# Patient Record
Sex: FEMALE | Race: WHITE | NOT HISPANIC OR LATINO | Employment: PART TIME | URBAN - METROPOLITAN AREA
[De-identification: names, ages, dates, MRNs, and addresses within clinical notes are randomized per-mention and may not be internally consistent; named-entity substitution may affect disease eponyms.]

---

## 2017-09-29 ENCOUNTER — OFFICE VISIT (OUTPATIENT)
Dept: NEUROLOGY | Facility: CLINIC | Age: 47
End: 2017-09-29
Payer: MEDICAID

## 2017-09-29 VITALS
HEART RATE: 76 BPM | SYSTOLIC BLOOD PRESSURE: 122 MMHG | WEIGHT: 146.19 LBS | HEIGHT: 64 IN | BODY MASS INDEX: 24.96 KG/M2 | DIASTOLIC BLOOD PRESSURE: 88 MMHG

## 2017-09-29 DIAGNOSIS — G43.019 INTRACTABLE MIGRAINE WITHOUT AURA AND WITHOUT STATUS MIGRAINOSUS: Primary | ICD-10-CM

## 2017-09-29 DIAGNOSIS — M79.7 FIBROMYALGIA: ICD-10-CM

## 2017-09-29 DIAGNOSIS — B02.29 POSTHERPETIC NEURALGIA: ICD-10-CM

## 2017-09-29 PROCEDURE — 99999 PR PBB SHADOW E&M-NEW PATIENT-LVL III: CPT | Mod: PBBFAC,,, | Performed by: PSYCHIATRY & NEUROLOGY

## 2017-09-29 PROCEDURE — 99203 OFFICE O/P NEW LOW 30 MIN: CPT | Mod: S$PBB,,, | Performed by: PSYCHIATRY & NEUROLOGY

## 2017-09-29 PROCEDURE — 99203 OFFICE O/P NEW LOW 30 MIN: CPT | Mod: PBBFAC | Performed by: PSYCHIATRY & NEUROLOGY

## 2017-09-29 PROCEDURE — 3008F BODY MASS INDEX DOCD: CPT | Mod: ,,, | Performed by: PSYCHIATRY & NEUROLOGY

## 2017-09-29 RX ORDER — FLUTICASONE PROPIONATE 50 MCG
1 SPRAY, SUSPENSION (ML) NASAL
COMMUNITY
Start: 2015-12-15

## 2017-09-29 RX ORDER — BUTALBITAL, ACETAMINOPHEN AND CAFFEINE 50; 325; 40 MG/1; MG/1; MG/1
1 TABLET ORAL 2 TIMES DAILY
Qty: 30 TABLET | Refills: 0 | Status: SHIPPED | OUTPATIENT
Start: 2017-09-29 | End: 2017-11-15 | Stop reason: SDUPTHER

## 2017-09-29 RX ORDER — PREGABALIN 100 MG/1
100 CAPSULE ORAL 2 TIMES DAILY
Qty: 60 CAPSULE | Refills: 5 | Status: SHIPPED | OUTPATIENT
Start: 2017-09-29 | End: 2019-10-15

## 2017-09-29 RX ORDER — PROPRANOLOL HYDROCHLORIDE 40 MG/1
40 TABLET ORAL 2 TIMES DAILY
Qty: 60 TABLET | Refills: 11 | Status: SHIPPED | OUTPATIENT
Start: 2017-09-29 | End: 2018-09-29

## 2017-09-29 RX ORDER — ARIPIPRAZOLE 5 MG/1
1 TABLET ORAL DAILY
COMMUNITY
Start: 2016-03-07

## 2017-09-29 RX ORDER — PROMETHAZINE HYDROCHLORIDE 25 MG/1
25 TABLET ORAL EVERY 6 HOURS PRN
Qty: 30 TABLET | Refills: 3 | Status: SHIPPED | OUTPATIENT
Start: 2017-09-29

## 2017-09-29 RX ORDER — DULOXETIN HYDROCHLORIDE 30 MG/1
1 CAPSULE, DELAYED RELEASE ORAL DAILY
COMMUNITY
Start: 2016-02-18 | End: 2019-11-03

## 2017-09-29 RX ORDER — DULOXETIN HYDROCHLORIDE 60 MG/1
1.5 CAPSULE, DELAYED RELEASE ORAL DAILY
COMMUNITY
Start: 2016-02-18

## 2017-09-29 RX ORDER — PROMETHAZINE HYDROCHLORIDE 25 MG/1
1 TABLET ORAL
COMMUNITY
Start: 2015-12-24 | End: 2017-09-29 | Stop reason: SDUPTHER

## 2017-09-29 RX ORDER — LORATADINE 10 MG/1
1 TABLET ORAL DAILY
COMMUNITY
Start: 2016-03-08

## 2017-09-29 RX ORDER — TRAZODONE HYDROCHLORIDE 100 MG/1
1 TABLET ORAL NIGHTLY
COMMUNITY
Start: 2016-02-16

## 2017-09-29 NOTE — PROGRESS NOTES
This is a 47-year-old right-handed patient who was last seen in this office in light, 2013 when she had a history of intractable headache that is been present since her early teens.  The patient was lost to follow-up in this office and was seen by neurology elsewhere.  She was placed on propranolol 40 mg twice a day which she states has been of significant benefit in reducing the frequency of her headache pain.    The patient states that her migraine headache now occurs about 2 times a month.  There is no clear aura prior to the onset of the pain.  She has identified exposure to bright light including fluorescent lights as a trigger for her headache.  The pain when present is centered around the left temple and left orbit.  The pain is felt as a pounding throbbing discomfort and is clearly associated with nausea, occasional emesis, photophobia, phonophobia, and movement intolerance.  Patient states that she can control the pain if she takes Fioricet one or 2 tablets at the onset of discomfort.  A trial of the triptan's for the abortive treatment was not effective for her.    Since last being seen, the patient had an episode of shingles across the upper abdomen.  This resulted in a severe burning pain and discomfort that has persisted until she was placed on Lyrica 100 mg twice a day.  The Lyrica has controlled her pain.  She tried previously gabapentin without any benefit.    In addition to those complaints, the patient does have fibromyalgia.  She is being treated by pain management for her fibromyalgia and joint pain.      ROS:  GENERAL: No fever, chills,  or weight loss.  SKIN: No rashes, itching or changes in color or texture of skin.  HEAD: No  recent head trauma.  EYES: Visual acuity fine. No photophobia, ocular pain or diplopia.  EARS: Denies ear pain, discharge or vertigo.  NOSE: No loss of smell, no epistaxis or postnasal drip.  MOUTH & THROAT: No hoarseness or change in voice. No excessive gum  bleeding.  NODES: Denies swollen glands.  CHEST: Denies DOWNS, cyanosis, wheezing, cough and sputum production.  CARDIOVASCULAR: Denies chest pain, PND, orthopnea or reduced exercise tolerance.  ABDOMEN: Appetite fine. No weight loss. Denies diarrhea, abdominal pain, hematemesis or blood in stool.  URINARY: No flank pain, dysuria or hematuria.  PERIPHERAL VASCULAR: No claudication or cyanosis.  MUSCULOSKELETAL: Patient has had joint pain in multiple different joints including the major joints as well as small joints of the hands.  NEUROLOGIC: No history of seizures, paralysis, alteration of gait or coordination.    The patient's past history, family history, and social history have been reviewed within the electronic medical record.  Note the change in regards to her history of postherpetic neuralgia.    PE:   VITAL SIGNS: Blood pressure 122/88, pulse 76, weight 66.3 kg, height 5 foot 4 inches, BMI 25.09  APPEARANCE: Well nourished, well developed, in no acute distress.    HEAD: Normocephalic, atraumatic.  EYES: PERRL. EOMI.  Non-icteric sclerae.    EARS: TM's intact. Light reflex normal. No retraction or perforation.    NOSE: Mucosa pink. Airway clear.  MOUTH & THROAT: No tonsillar enlargement. No pharyngeal erythema or exudate. No stridor.  NECK: Supple. No bruits.  CHEST: Lungs clear to auscultation.  CARDIOVASCULAR: Regular rhythm without significant murmurs.  ABDOMEN: Bowel sounds normal. Not distended.  The patient has mild hyperpathia to light touch across the upper abdomen just below the costal margin on the right.  MUSCULOSKELETAL:  No bony deformity seen.  Muscle tone and muscle mass are normal both upper and both lower extremities.  NEUROLOGIC:   Mental Status:  The patient is well oriented to person, time, place, and situation.  The patient is attentive to the environment and cooperative for the exam.  Cranial Nerves: II-XII grossly intact. Fundoscopic exam is normal.  No hemorrhage, exudate or papilledema  is present. The extraocular muscles are intact in the cardinal directions of gaze.  No ptosis is present. Facial features are symmetrical.  Speech is normal in fluency, diction, and phrasing.  Tongue protrudes in the midline.    Gait and Station:  Romberg is negative.  Good alternate armswing with normal gait.  Motor:  No downdrift of either arm when held at shoulder level.  Manual muscle testing of proximal and distal muscles of both upper and lower extremities is normal.   Sensory:  Intact both upper and lower extremities to pin prick, touch, and vibration.  Cerebellar:  Finger to nose done well.  Alternating movements intact.  No involuntary movements or tremor seen.  Reflexes:  Stretch reflexes are 2+ both upper and lower extremities.  Plantar stimulation is flexor bilaterally and no pathological reflexes are seen       ASSESSMENT:  1.  Migraine headache without aura, without status migrainosus, intractable  2.  Postherpetic neuralgia  3.  Fibromyalgia    RECOMMENDATIONS:  1.  The patient is found propranolol to be effective at 40 mg twice a day and this will be continued for migraine prevention.  2.  Fioricet one or 2 tablets at onset of pain has been effective for the patient when she failed the triptan drugs.  This will also be continued.  3.  Promethazine 25 mg as needed for nausea associated with migraine  4.  Lyrica 100 mg twice a day for postherpetic neuralgia management  5.  Return to neurology in 6 months.    This was a 45 minute visit with the patient with over 50% of time spent counseling the patient regarding the use of multiple medications for management of her migraine headache and postherpetic neuralgia   This note is generated with speech recognition software and is subject to transcription error and sound alike phrases that may be missed by proofreading.

## 2017-09-29 NOTE — LETTER
September 29, 2017      Bertram Shah MD  [address] [facility] [address]  [facility] [address]           [facility]  [address] [place] [address]  Phone: [phone]  Fax: [phone]          Patient: Yamilet De Los Santos   MR Number: [id number]   YOB: 1970   Date of Visit: 9/29/2017       Dear Dr. Bertram Shah:    Thank you for referring Yamilet De Los Santos to me for evaluation. Attached you will find relevant portions of my assessment and plan of care.    If you have questions, please do not hesitate to call me. I look forward to following Yamilet De Los Santos along with you.    Sincerely,    Bhavik Bermudez MD    Enclosure  CC:  No Recipients    If you would like to receive this communication electronically, please contact [email or web address] or [phone] to request more information on [employer] access.    For providers and/or their staff who would like to refer a patient to [facility], please contact us through our one-stop-shop provider referral line, [facility], at [phone].    If you feel you have received this communication in error or would no longer like to receive these types of communications, please e-mail [email or web address]

## 2017-10-11 ENCOUNTER — TELEPHONE (OUTPATIENT)
Dept: NEUROLOGY | Facility: CLINIC | Age: 47
End: 2017-10-11

## 2017-10-11 NOTE — TELEPHONE ENCOUNTER
Patient requesting a prior authorization on Lyrica. Advised her that it will be initiated with her insurance.

## 2017-10-11 NOTE — TELEPHONE ENCOUNTER
----- Message from Israel Herrera sent at 10/10/2017  3:01 PM CDT -----  Pt is requesting a call from nurse in regards to a refill on her lyrica.        Please call pt back at [phone]        .  [facility] [address]  [address]  [facility] [address]  Phone: [phone] Fax: [phone]

## 2017-11-15 DIAGNOSIS — G43.019 INTRACTABLE MIGRAINE WITHOUT AURA AND WITHOUT STATUS MIGRAINOSUS: ICD-10-CM

## 2017-11-16 RX ORDER — BUTALBITAL, ACETAMINOPHEN AND CAFFEINE 50; 325; 40 MG/1; MG/1; MG/1
TABLET ORAL
Qty: 30 TABLET | Refills: 0 | Status: SHIPPED | OUTPATIENT
Start: 2017-11-16 | End: 2017-12-23 | Stop reason: SDUPTHER

## 2017-12-23 DIAGNOSIS — G43.019 INTRACTABLE MIGRAINE WITHOUT AURA AND WITHOUT STATUS MIGRAINOSUS: ICD-10-CM

## 2017-12-24 RX ORDER — BUTALBITAL, ACETAMINOPHEN AND CAFFEINE 50; 325; 40 MG/1; MG/1; MG/1
TABLET ORAL
Qty: 30 TABLET | Refills: 0 | Status: SHIPPED | OUTPATIENT
Start: 2017-12-24 | End: 2018-05-07

## 2018-03-13 ENCOUNTER — TELEPHONE (OUTPATIENT)
Dept: NEUROLOGY | Facility: CLINIC | Age: 48
End: 2018-03-13

## 2018-03-13 NOTE — TELEPHONE ENCOUNTER
3-13-18called pt, gave her the number to medical records she will need to contact them for all her records/verbalized understanding/

## 2018-03-13 NOTE — TELEPHONE ENCOUNTER
----- Message from Tory Ceron sent at 3/13/2018 10:11 AM CDT -----  Contact: pt  Please fax the pt medical records to her  pain management doc at [phone], the pt can  Be reached at [phone]///[email or web address]

## 2018-05-06 ENCOUNTER — HOSPITAL ENCOUNTER (EMERGENCY)
Facility: HOSPITAL | Age: 48
Discharge: HOME | End: 2018-05-07
Attending: EMERGENCY MEDICINE
Payer: MEDICAID

## 2018-05-06 ENCOUNTER — NURSE TRIAGE (OUTPATIENT)
Dept: ADMINISTRATIVE | Facility: CLINIC | Age: 48
End: 2018-05-06

## 2018-05-06 VITALS
OXYGEN SATURATION: 100 % | BODY MASS INDEX: 26.74 KG/M2 | HEIGHT: 64 IN | DIASTOLIC BLOOD PRESSURE: 77 MMHG | RESPIRATION RATE: 20 BRPM | HEART RATE: 92 BPM | WEIGHT: 156.63 LBS | SYSTOLIC BLOOD PRESSURE: 118 MMHG | TEMPERATURE: 98 F

## 2018-05-06 DIAGNOSIS — G43.019 INTRACTABLE MIGRAINE WITHOUT AURA AND WITHOUT STATUS MIGRAINOSUS: ICD-10-CM

## 2018-05-06 DIAGNOSIS — J20.9 ACUTE BRONCHITIS, UNSPECIFIED ORGANISM: Primary | ICD-10-CM

## 2018-05-06 LAB
ANION GAP SERPL CALC-SCNC: 11 MMOL/L
BASOPHILS # BLD AUTO: 0.03 K/UL
BASOPHILS NFR BLD: 0.3 %
BUN SERPL-MCNC: 9 MG/DL
CALCIUM SERPL-MCNC: 8.7 MG/DL
CHLORIDE SERPL-SCNC: 101 MMOL/L
CO2 SERPL-SCNC: 24 MMOL/L
CREAT SERPL-MCNC: 0.9 MG/DL
DIFFERENTIAL METHOD: ABNORMAL
EOSINOPHIL # BLD AUTO: 0.8 K/UL
EOSINOPHIL NFR BLD: 7.5 %
ERYTHROCYTE [DISTWIDTH] IN BLOOD BY AUTOMATED COUNT: 13.3 %
EST. GFR  (AFRICAN AMERICAN): >60 ML/MIN/1.73 M^2
EST. GFR  (NON AFRICAN AMERICAN): >60 ML/MIN/1.73 M^2
GLUCOSE SERPL-MCNC: 98 MG/DL
HCT VFR BLD AUTO: 36.9 %
HGB BLD-MCNC: 12.9 G/DL
LYMPHOCYTES # BLD AUTO: 1.6 K/UL
LYMPHOCYTES NFR BLD: 14.1 %
MCH RBC QN AUTO: 33.5 PG
MCHC RBC AUTO-ENTMCNC: 35 G/DL
MCV RBC AUTO: 96 FL
MONOCYTES # BLD AUTO: 0.8 K/UL
MONOCYTES NFR BLD: 6.8 %
NEUTROPHILS # BLD AUTO: 7.9 K/UL
NEUTROPHILS NFR BLD: 71.3 %
PLATELET # BLD AUTO: 294 K/UL
PMV BLD AUTO: 9.2 FL
POTASSIUM SERPL-SCNC: 3.6 MMOL/L
RBC # BLD AUTO: 3.85 M/UL
SODIUM SERPL-SCNC: 136 MMOL/L
WBC # BLD AUTO: 11.1 K/UL

## 2018-05-06 PROCEDURE — 94640 AIRWAY INHALATION TREATMENT: CPT

## 2018-05-06 PROCEDURE — 25000242 PHARM REV CODE 250 ALT 637 W/ HCPCS: Performed by: EMERGENCY MEDICINE

## 2018-05-06 PROCEDURE — 96374 THER/PROPH/DIAG INJ IV PUSH: CPT

## 2018-05-06 PROCEDURE — 99284 EMERGENCY DEPT VISIT MOD MDM: CPT | Mod: 25

## 2018-05-06 PROCEDURE — 80048 BASIC METABOLIC PNL TOTAL CA: CPT

## 2018-05-06 PROCEDURE — 63600175 PHARM REV CODE 636 W HCPCS: Performed by: EMERGENCY MEDICINE

## 2018-05-06 PROCEDURE — 96375 TX/PRO/DX INJ NEW DRUG ADDON: CPT

## 2018-05-06 PROCEDURE — 85025 COMPLETE CBC W/AUTO DIFF WBC: CPT

## 2018-05-06 PROCEDURE — 25000003 PHARM REV CODE 250: Performed by: EMERGENCY MEDICINE

## 2018-05-06 PROCEDURE — 96361 HYDRATE IV INFUSION ADD-ON: CPT

## 2018-05-06 RX ORDER — METHYLPREDNISOLONE SOD SUCC 125 MG
125 VIAL (EA) INJECTION
Status: COMPLETED | OUTPATIENT
Start: 2018-05-06 | End: 2018-05-06

## 2018-05-06 RX ORDER — NITROFURANTOIN 25; 75 MG/1; MG/1
100 CAPSULE ORAL
COMMUNITY
End: 2019-10-15

## 2018-05-06 RX ORDER — KETOROLAC TROMETHAMINE 30 MG/ML
15 INJECTION, SOLUTION INTRAMUSCULAR; INTRAVENOUS
Status: COMPLETED | OUTPATIENT
Start: 2018-05-06 | End: 2018-05-06

## 2018-05-06 RX ORDER — IPRATROPIUM BROMIDE AND ALBUTEROL SULFATE 2.5; .5 MG/3ML; MG/3ML
3 SOLUTION RESPIRATORY (INHALATION)
Status: COMPLETED | OUTPATIENT
Start: 2018-05-06 | End: 2018-05-06

## 2018-05-06 RX ORDER — MECLIZINE HYDROCHLORIDE CHEWABLE TABLETS 25 MG/1
32 TABLET, CHEWABLE ORAL 3 TIMES DAILY PRN
COMMUNITY
End: 2019-10-15

## 2018-05-06 RX ORDER — BENZONATATE 100 MG/1
100 CAPSULE ORAL 3 TIMES DAILY PRN
Status: ON HOLD | COMMUNITY
End: 2019-11-23

## 2018-05-06 RX ORDER — OMEPRAZOLE 40 MG/1
40 CAPSULE, DELAYED RELEASE ORAL DAILY
COMMUNITY

## 2018-05-06 RX ADMIN — IPRATROPIUM BROMIDE AND ALBUTEROL SULFATE 3 ML: .5; 3 SOLUTION RESPIRATORY (INHALATION) at 10:05

## 2018-05-06 RX ADMIN — KETOROLAC TROMETHAMINE 15 MG: 30 INJECTION, SOLUTION INTRAMUSCULAR; INTRAVENOUS at 10:05

## 2018-05-06 RX ADMIN — SODIUM CHLORIDE 1000 ML: 0.9 INJECTION, SOLUTION INTRAVENOUS at 10:05

## 2018-05-06 RX ADMIN — METHYLPREDNISOLONE SODIUM SUCCINATE 125 MG: 125 INJECTION, POWDER, FOR SOLUTION INTRAMUSCULAR; INTRAVENOUS at 10:05

## 2018-05-07 RX ORDER — GUAIFENESIN/DEXTROMETHORPHAN 100-10MG/5
5 SYRUP ORAL 4 TIMES DAILY PRN
Qty: 120 ML | Refills: 0 | Status: SHIPPED | OUTPATIENT
Start: 2018-05-07 | End: 2018-05-17

## 2018-05-07 RX ORDER — AZITHROMYCIN 250 MG/1
250 TABLET, FILM COATED ORAL DAILY
Qty: 6 TABLET | Refills: 0 | Status: SHIPPED | OUTPATIENT
Start: 2018-05-07 | End: 2019-11-03

## 2018-05-07 RX ORDER — PREDNISONE 50 MG/1
50 TABLET ORAL DAILY
Qty: 5 TABLET | Refills: 0 | Status: SHIPPED | OUTPATIENT
Start: 2018-05-07 | End: 2018-05-17

## 2018-05-07 RX ORDER — BUTALBITAL, ACETAMINOPHEN AND CAFFEINE 50; 325; 40 MG/1; MG/1; MG/1
1 TABLET ORAL 2 TIMES DAILY
Qty: 30 TABLET | Refills: 0 | Status: SHIPPED | OUTPATIENT
Start: 2018-05-07 | End: 2018-09-04 | Stop reason: SDUPTHER

## 2018-05-07 NOTE — ED PROVIDER NOTES
SCRIBE #1 NOTE: I, Marky Elder, am scribing for, and in the presence of, Brock Sampson Do, MD. I have scribed the entire note.      History      Chief Complaint   Patient presents with    Bronchitis     reports has asthmatic bronchitis. coughing for over a week.    Dizziness       Review of patient's allergies indicates:   Allergen Reactions    Demerol [meperidine] Hives, Itching and Swelling    Morphine Hives, Itching and Swelling    Pcn [penicillins] Hives    Sulfa (sulfonamide antibiotics)         HPI   HPI    5/6/2018, 10:09 PM   History obtained from the patient      History of Present Illness: Yamilet De Los Santos is a 47 y.o. female patient with a PMHx of asthmatic bronchitis and cancer who presents to the Emergency Department for cough which onset gradually 2 weeks ago.  Pt reports she called her PCP last week about her sxs and was given Macrodantin.  Pt states her PCP gave her the wrong medicine because she does not have a UTI. Symptoms are constant and moderate in severity. No mitigating or exacerbating factors reported. Associated sxs include fever, wheezing, and SOB secondary to cough. Patient denies any chills, diaphoresis, HA, rhinorrhea, congestion, sore throat, CP, palpitations, extremity weakness/numbness, leg pain/swelling, n/v, and all other sxs at this time. Prior Tx includes inhaler usage with no relief. Pt reports that she currently smokes. No further complaints or concerns at this time.         Arrival mode: Personal vehicle    PCP: Emma Lara MD       Past Medical History:  Past Medical History:   Diagnosis Date    Anxiety     Asthma     Cancer     Depression     Headache(784.0)     Migraine headache     cluster       Past Surgical History:  Past Surgical History:   Procedure Laterality Date    HEMORRHOID SURGERY      HYSTERECTOMY      MANDIBLE FRACTURE SURGERY           Family History:  Family History   Problem Relation Age of Onset    Diabetes Mother      Heart disease Mother     Heart disease Father     Diabetes Sister     Cancer Paternal Grandmother     Diabetes Paternal Grandmother     Cancer Paternal Grandfather        Social History:  Social History     Social History Main Topics    Smoking status: Current Every Day Smoker     Packs/day: 0.50    Smokeless tobacco: Never Used    Alcohol use No    Drug use: No    Sexual activity: Not on file       ROS   Review of Systems   Constitutional: Positive for fever. Negative for chills and diaphoresis.   HENT: Negative for congestion, rhinorrhea and sore throat.    Respiratory: Positive for cough, shortness of breath (secondary to cough) and wheezing.    Cardiovascular: Negative for chest pain, palpitations and leg swelling.   Gastrointestinal: Negative for nausea and vomiting.   Genitourinary: Negative for dysuria.   Musculoskeletal: Negative for back pain.   Skin: Negative for rash.   Neurological: Negative for weakness, numbness and headaches.   Hematological: Does not bruise/bleed easily.   All other systems reviewed and are negative.    Physical Exam      Initial Vitals [05/06/18 2207]   BP Pulse Resp Temp SpO2   127/72 90 20 98.3 °F (36.8 °C) --      MAP       90.33          Physical Exam  Nursing Notes and Vital Signs Reviewed.  Constitutional: Patient is in no acute distress. Well-developed and well-nourished.  Head: Atraumatic. Normocephalic.  Eyes: PERRL. EOM intact. Conjunctivae are not pale. No scleral icterus.  ENT: Mucous membranes are moist. Oropharynx is clear and symmetric.    Neck: Supple. Full ROM. No lymphadenopathy.  Cardiovascular: Regular rate. Regular rhythm. No murmurs, rubs, or gallops. Distal pulses are 2+ and symmetric.  Pulmonary/Chest: No respiratory distress. Wheezing noted. No rales.  Abdominal: Soft and non-distended.  There is no tenderness.  No rebound, guarding, or rigidity.  Musculoskeletal: Moves all extremities. No obvious deformities. No edema.  Skin: Warm and  "dry.  Neurological:  Alert, awake, and appropriate.  Normal speech.  No acute focal neurological deficits are appreciated.  Psychiatric: Normal affect. Good eye contact. Appropriate in content.    ED Course    Procedures  ED Vital Signs:  Vitals:    05/06/18 2207 05/06/18 2230 05/06/18 2237 05/06/18 2246   BP: 127/72      Pulse: 90 81 84 93   Resp: 20 20 20 20   Temp: 98.3 °F (36.8 °C)      TempSrc: Oral      SpO2:  98% 98% 98%   Weight: 71.1 kg (156 lb 10.2 oz)      Height: 5' 4" (1.626 m)       05/06/18 2354   BP: 118/77   Pulse: 92   Resp: 20   Temp:    TempSrc:    SpO2: 100%   Weight:    Height:        Abnormal Lab Results:  Labs Reviewed   CBC W/ AUTO DIFFERENTIAL - Abnormal; Notable for the following:        Result Value    RBC 3.85 (*)     Hematocrit 36.9 (*)     MCH 33.5 (*)     Gran # (ANC) 7.9 (*)     Eos # 0.8 (*)     Lymph% 14.1 (*)     All other components within normal limits   BASIC METABOLIC PANEL        All Lab Results:  Results for orders placed or performed during the hospital encounter of 05/06/18   CBC auto differential   Result Value Ref Range    WBC 11.10 3.90 - 12.70 K/uL    RBC 3.85 (L) 4.00 - 5.40 M/uL    Hemoglobin 12.9 12.0 - 16.0 g/dL    Hematocrit 36.9 (L) 37.0 - 48.5 %    MCV 96 82 - 98 fL    MCH 33.5 (H) 27.0 - 31.0 pg    MCHC 35.0 32.0 - 36.0 g/dL    RDW 13.3 11.5 - 14.5 %    Platelets 294 150 - 350 K/uL    MPV 9.2 9.2 - 12.9 fL    Gran # (ANC) 7.9 (H) 1.8 - 7.7 K/uL    Lymph # 1.6 1.0 - 4.8 K/uL    Mono # 0.8 0.3 - 1.0 K/uL    Eos # 0.8 (H) 0.0 - 0.5 K/uL    Baso # 0.03 0.00 - 0.20 K/uL    Gran% 71.3 38.0 - 73.0 %    Lymph% 14.1 (L) 18.0 - 48.0 %    Mono% 6.8 4.0 - 15.0 %    Eosinophil% 7.5 0.0 - 8.0 %    Basophil% 0.3 0.0 - 1.9 %    Differential Method Automated    Basic metabolic panel   Result Value Ref Range    Sodium 136 136 - 145 mmol/L    Potassium 3.6 3.5 - 5.1 mmol/L    Chloride 101 95 - 110 mmol/L    CO2 24 23 - 29 mmol/L    Glucose 98 70 - 110 mg/dL    BUN, Bld 9 6 - 20 " mg/dL    Creatinine 0.9 0.5 - 1.4 mg/dL    Calcium 8.7 8.7 - 10.5 mg/dL    Anion Gap 11 8 - 16 mmol/L    eGFR if African American >60 >60 mL/min/1.73 m^2    eGFR if non African American >60 >60 mL/min/1.73 m^2         Imaging Results:  Imaging Results          X-Ray Chest PA And Lateral (Final result)  Result time 05/07/18 00:11:16    Final result by Johnny Shepard MD (05/07/18 00:11:16)                 Impression:      Mild wedge-shaped area of consolidation or pneumonia within the left lower lobe.      Electronically signed by: Johnny Shepard MD  Date:    05/07/2018  Time:    00:11             Narrative:    EXAMINATION:  XR CHEST PA AND LATERAL    CLINICAL HISTORY:  Asthma;    COMPARISON:  None    FINDINGS:  Subtle wedge-shaped linear increased density identified within the retrocardiac left lower lobe.  Finding could represent a small pneumonia or consolidation.  Heart size within normal limits.No significant bony findings.                                      The Emergency Provider reviewed the vital signs and test results, which are outlined above.    ED Discussion     12:35 AM: Reassessed pt at this time.  Pt reports she can breathe much better now and that she feels much better. Pt states her condition has improved at this time. Discussed with pt all pertinent ED information and results. Discussed pt dx and plan of tx. Gave pt all f/u and return to the ED instructions. All questions and concerns were addressed at this time. Pt expresses understanding of information and instructions, and is comfortable with plan to discharge. Pt is stable for discharge.    I discussed with patient that evaluation in the ED does not suggest any emergent or life threatening medical conditions requiring immediate intervention beyond what was provided in the ED, and I believe patient is safe for discharge.  Regardless, an unremarkable evaluation in the ED does not preclude the development or presence of a serious of  life threatening condition. As such, patient was instructed to return immediately for any worsening or change in current symptoms.    ED Medication(s):  Medications   albuterol-ipratropium 2.5mg-0.5mg/3mL nebulizer solution 3 mL (3 mLs Nebulization Given 5/6/18 2246)   methylPREDNISolone sodium succinate injection 125 mg (125 mg Intravenous Given 5/6/18 2246)   sodium chloride 0.9% bolus 1,000 mL (0 mLs Intravenous Stopped 5/7/18 0100)   ketorolac injection 15 mg (15 mg Intravenous Given 5/6/18 2246)       Discharge Medication List as of 5/7/2018 12:48 AM      START taking these medications    Details   azithromycin (Z-SOY) 250 MG tablet Take 1 tablet (250 mg total) by mouth once daily. Take first 2 tablets together, then 1 every day until finished., Starting Mon 5/7/2018, Print      dextromethorphan-guaifenesin  mg/5 ml (ROBITUSSIN-DM)  mg/5 mL liquid Take 5 mLs by mouth 4 (four) times daily as needed (cough)., Starting Mon 5/7/2018, Until Thu 5/17/2018, Print      predniSONE (DELTASONE) 50 MG Tab Take 1 tablet (50 mg total) by mouth once daily., Starting Mon 5/7/2018, Until Thu 5/17/2018, Normal             Follow-up Information     Emma Lara MD In 2 days.    Specialty:  Internal Medicine  Contact information:  [facility] [id number] [phone]                     Medical Decision Making    Medical Decision Making:   Clinical Tests:   Lab Tests: Reviewed and Ordered  Radiological Study: Reviewed and Ordered           Scribe Attestation:   Scribe #1: I performed the above scribed service and the documentation accurately describes the services I performed. I attest to the accuracy of the note.    Attending:   Physician Attestation Statement for Scribe #1: I, Brock Sampson Do, MD, personally performed the services described in this documentation, as scribed by Marky Elder, in my presence, and it is both accurate and complete.          Clinical Impression       ICD-10-CM  ICD-9-CM   1. Acute bronchitis, unspecified organism J20.9 466.0   2. Intractable migraine without aura and without status migrainosus G43.019 346.11       Disposition:   Disposition: Discharged  Condition: Stable         Brock Sampson Do, MD  05/07/18 0206

## 2018-05-07 NOTE — ED NOTES
Pt states breathing much better but still has headache.  IV fluids cont to infuse with no s/s of infiltration.

## 2018-05-07 NOTE — ED NOTES
Pt states has been coughing for 2 weeks with productive cough past week and fever. States spoke with PCP and rxs called in for antibiotics and cough meds.   Pt with harsh productive cough noted.  BBS clear = with diminished to bases posteriorly.  Pt states having a headache and pain to chest from coughing so much.

## 2018-09-04 DIAGNOSIS — G43.019 INTRACTABLE MIGRAINE WITHOUT AURA AND WITHOUT STATUS MIGRAINOSUS: ICD-10-CM

## 2018-09-04 RX ORDER — BUTALBITAL, ACETAMINOPHEN AND CAFFEINE 50; 325; 40 MG/1; MG/1; MG/1
1 TABLET ORAL 2 TIMES DAILY
Qty: 30 TABLET | Refills: 0 | Status: SHIPPED | OUTPATIENT
Start: 2018-09-04

## 2019-01-06 DIAGNOSIS — G43.019 INTRACTABLE MIGRAINE WITHOUT AURA AND WITHOUT STATUS MIGRAINOSUS: ICD-10-CM

## 2019-01-07 RX ORDER — BUTALBITAL, ACETAMINOPHEN AND CAFFEINE 50; 325; 40 MG/1; MG/1; MG/1
TABLET ORAL
Qty: 30 TABLET | Refills: 0 | OUTPATIENT
Start: 2019-01-07

## 2019-02-08 ENCOUNTER — TELEPHONE (OUTPATIENT)
Dept: NEUROLOGY | Facility: CLINIC | Age: 49
End: 2019-02-08

## 2019-02-08 RX ORDER — SUMATRIPTAN SUCCINATE 100 MG/1
TABLET ORAL
Qty: 9 TABLET | Refills: 6 | Status: SHIPPED | OUTPATIENT
Start: 2019-02-08 | End: 2019-03-18 | Stop reason: SDUPTHER

## 2019-02-08 NOTE — TELEPHONE ENCOUNTER
Pt is having a headache and cant drive she has moved to [place] and wants imitrex or something called in to help her fioricet is not helping , states she is eating them like candy/please advise

## 2019-02-08 NOTE — TELEPHONE ENCOUNTER
----- Message from Magalie Renteria sent at 2/8/2019 12:57 PM CST -----  Contact: PT   Pt would like to get GABRIEL called in for her headaches. Pt states that she know that she cant drive this far with the headaches.   [phone] (home)     .    [facility] [address] [place] [address] [address] [place] [address]  [address] [facility] [address]  Phone: [phone] Fax: [phone]

## 2019-03-18 ENCOUNTER — OFFICE VISIT (OUTPATIENT)
Dept: NEUROLOGY | Facility: CLINIC | Age: 49
End: 2019-03-18
Payer: MEDICAID

## 2019-03-18 VITALS
SYSTOLIC BLOOD PRESSURE: 112 MMHG | BODY MASS INDEX: 28.19 KG/M2 | HEART RATE: 85 BPM | HEIGHT: 64 IN | DIASTOLIC BLOOD PRESSURE: 68 MMHG | WEIGHT: 165.13 LBS

## 2019-03-18 DIAGNOSIS — G43.019 INTRACTABLE MIGRAINE WITHOUT AURA AND WITHOUT STATUS MIGRAINOSUS: Primary | ICD-10-CM

## 2019-03-18 PROCEDURE — 99999 PR PBB SHADOW E&M-EST. PATIENT-LVL III: ICD-10-PCS | Mod: PBBFAC,,, | Performed by: PSYCHIATRY & NEUROLOGY

## 2019-03-18 PROCEDURE — 99999 PR PBB SHADOW E&M-EST. PATIENT-LVL III: CPT | Mod: PBBFAC,,, | Performed by: PSYCHIATRY & NEUROLOGY

## 2019-03-18 PROCEDURE — 99214 PR OFFICE/OUTPT VISIT, EST, LEVL IV, 30-39 MIN: ICD-10-PCS | Mod: S$PBB,,, | Performed by: PSYCHIATRY & NEUROLOGY

## 2019-03-18 PROCEDURE — 99213 OFFICE O/P EST LOW 20 MIN: CPT | Mod: PBBFAC | Performed by: PSYCHIATRY & NEUROLOGY

## 2019-03-18 PROCEDURE — 99214 OFFICE O/P EST MOD 30 MIN: CPT | Mod: S$PBB,,, | Performed by: PSYCHIATRY & NEUROLOGY

## 2019-03-18 RX ORDER — POTASSIUM CHLORIDE 20 MEQ/1
20 TABLET, EXTENDED RELEASE ORAL 2 TIMES DAILY
Refills: 3 | COMMUNITY
Start: 2019-03-05 | End: 2019-12-13

## 2019-03-18 RX ORDER — PROPRANOLOL HYDROCHLORIDE 60 MG/1
CAPSULE, EXTENDED RELEASE ORAL
Refills: 5 | COMMUNITY
Start: 2019-03-05 | End: 2019-03-18 | Stop reason: DRUGHIGH

## 2019-03-18 RX ORDER — OXYCODONE AND ACETAMINOPHEN 5; 325 MG/1; MG/1
TABLET ORAL
Refills: 0 | COMMUNITY
Start: 2019-03-06 | End: 2019-11-08 | Stop reason: SDUPTHER

## 2019-03-18 RX ORDER — TRIAMCINOLONE ACETONIDE 1 MG/G
CREAM TOPICAL 2 TIMES DAILY PRN
Refills: 5 | COMMUNITY
Start: 2019-03-01 | End: 2020-02-21 | Stop reason: CLARIF

## 2019-03-18 RX ORDER — BACLOFEN 10 MG/1
10 TABLET ORAL NIGHTLY
Refills: 1 | COMMUNITY
Start: 2019-03-05

## 2019-03-18 RX ORDER — TIZANIDINE 4 MG/1
TABLET ORAL
Refills: 1 | Status: ON HOLD | COMMUNITY
Start: 2019-01-17 | End: 2019-11-23 | Stop reason: SDUPTHER

## 2019-03-18 RX ORDER — HYDROXYZINE PAMOATE 100 MG/1
100 CAPSULE ORAL NIGHTLY
Refills: 0 | COMMUNITY
Start: 2019-02-08

## 2019-03-18 RX ORDER — SUMATRIPTAN SUCCINATE 100 MG/1
TABLET ORAL
Qty: 9 TABLET | Refills: 6 | Status: SHIPPED | OUTPATIENT
Start: 2019-03-18 | End: 2019-09-25 | Stop reason: SDUPTHER

## 2019-03-18 RX ORDER — ATORVASTATIN CALCIUM 40 MG/1
TABLET, FILM COATED ORAL
Refills: 5 | Status: ON HOLD | COMMUNITY
Start: 2019-03-05 | End: 2019-11-23 | Stop reason: SDUPTHER

## 2019-03-18 NOTE — PROGRESS NOTES
Subjective:      Patient ID: Yamilet De Los Santos is a 48 y.o. female.    Chief Complaint:   My headaches are more frequent    The patient returns indicating that she is experiencing headache much more frequently.  In fact, patient is able to identify at least 2 different types of headache.  The more frequent headache occurs almost daily were as the migraine headache occurs at least 1 to 2 times a week.  The patient is also concerned because the headache has changed locations, now being felt in a bitemporal and retro-orbital area as opposed to being hemicranial in distribution.  The headache occurs on an almost daily basis across the forehead but the migraine headache is occurring more frequently as well.  The migraine pain is much more intense in is associated with nausea, vomiting, photophobia, and phonophobia.    The patient indicates that to control her headache, she has resorted to increased use of Fioricet taking up to 1 tablet 3 times a day at least 4 or 5 days a week.  In addition, she is a frequent visitor to the emergency room to get injections for pain management.  The patient is of the opinion that Dilaudid is the only medicine that will get rid of her headache when she is in the emergency room.    The patient denies any weakness, awkwardness, or clumsiness of her arms or legs.  She denies any numbness or tingling.  She denies any change in speech or swallowing.  She denies any change in vision.  She has not had any syncope or unexplained loss of consciousness.    The patient states that recently, she was changed from a twice a day propranolol to a once a day long-acting propranolol.  In retrospect, since changing to the delayed release propranolol, her headaches have become much more frequent.          ROS:  GENERAL: NO FEVER, CHILLS, FATIGABILITY OR WEIGHT LOSS.  SKIN: NO RASHES, ITCHING OR CHANGES IN COLOR OR TEXTURE OF SKIN.  HEAD: NO  RECENT HEAD TRAUMA.  EYES: VISUAL ACUITY FINE. NO PHOTOPHOBIA,  OCULAR PAIN OR DIPLOPIA.  EARS: DENIES EAR PAIN, DISCHARGE OR VERTIGO.  NOSE: NO LOSS OF SMELL, NO EPISTAXIS OR POSTNASAL DRIP.  MOUTH & THROAT: NO HOARSENESS OR CHANGE IN VOICE. NO EXCESSIVE GUM BLEEDING.  NODES: DENIES SWOLLEN GLANDS.  CHEST: DENIES DOWNS, CYANOSIS, WHEEZING, COUGH AND SPUTUM PRODUCTION.  CARDIOVASCULAR: DENIES CHEST PAIN, PND, ORTHOPNEA OR REDUCED EXERCISE TOLERANCE.  ABDOMEN: APPETITE FINE. NO WEIGHT LOSS. DENIES DIARRHEA, ABDOMINAL PAIN, HEMATEMESIS OR BLOOD IN STOOL.  URINARY: NO FLANK PAIN, DYSURIA OR HEMATURIA.  PERIPHERAL VASCULAR: NO CLAUDICATION OR CYANOSIS.  MUSCULOSKELETAL: NO JOINT STIFFNESS OR SWELLING. DENIES BACK PAIN.  NEUROLOGIC: NO HISTORY OF SEIZURES, PARALYSIS, ALTERATION OF GAIT OR COORDINATION.    Past Medical History:   Diagnosis Date    Anxiety     Asthma     Cancer     Depression     Headache(784.0)     Migraine headache     cluster     Past Surgical History:   Procedure Laterality Date    HEMORRHOID SURGERY      HYSTERECTOMY      MANDIBLE FRACTURE SURGERY       Family History   Problem Relation Age of Onset    Diabetes Mother     Heart disease Mother     Heart disease Father     Diabetes Sister     Cancer Paternal Grandmother     Diabetes Paternal Grandmother     Cancer Paternal Grandfather      Social History     Socioeconomic History    Marital status:      Spouse name: Not on file    Number of children: Not on file    Years of education: Not on file    Highest education level: Not on file   Social Needs    Financial resource strain: Not on file    Food insecurity - worry: Not on file    Food insecurity - inability: Not on file    Transportation needs - medical: Not on file    Transportation needs - non-medical: Not on file   Occupational History    Not on file   Tobacco Use    Smoking status: Current Every Day Smoker     Packs/day: 0.50    Smokeless tobacco: Never Used   Substance and Sexual Activity    Alcohol use: No    Drug use: No     Sexual activity: Not on file   Other Topics Concern    Not on file   Social History Narrative    Not on file         Objective:   PE:   VITAL SIGNS:   Height 5 ft 4 in, weight 74.9 kg, BMI 28.3  Vitals:    03/18/19 1031   BP: 112/68   Pulse: 85     APPEARANCE: WELL NOURISHED, WELL DEVELOPED, IN NO ACUTE DISTRESS.    HEAD: NORMOCEPHALIC, ATRAUMATIC. NO TEMPORALIS MUSCLE TENDERNESS.  NO TMJ TENDERNESS.  EYES: PERRL. EOMI.  NON-ICTERIC SCLERAE.    EARS: TM'S INTACT. LIGHT REFLEX NORMAL. NO RETRACTION OR PERFORATION.    NOSE: MUCOSA PINK. AIRWAY CLEAR.  MOUTH & THROAT: NO TONSILLAR ENLARGEMENT. NO PHARYNGEAL ERYTHEMA OR EXUDATE. NO STRIDOR.  NECK: SUPPLE. NO BRUITS.  CHEST: LUNGS CLEAR TO AUSCULTATION.  CARDIOVASCULAR: REGULAR RHYTHM WITHOUT SIGNIFICANT MURMURS.  MUSCULOSKELETAL:  NO BONY DEFORMITY SEEN.  MUSCLE TONE   AND MUSCLE MASS ARE NORMAL IN BOTH UPPER AND BOTH LOWER EXTREMITIES.    NEUROLOGIC:   MENTAL STATUS:  THE PATIENT IS WELL ORIENTED TO PERSON, TIME, PLACE, AND SITUATION.  THE PATIENT IS ATTENTIVE TO THE ENVIRONMENT AND COOPERATIVE FOR THE EXAM.  CRANIAL NERVES: II-XII GROSSLY INTACT. FUNDOSCOPIC EXAM IS NORMAL.  NO HEMORRHAGE, EXUDATE OR PAPILLEDEMA IS PRESENT. THE EXTRAOCULAR MUSCLES ARE INTACT IN THE CARDINAL DIRECTIONS OF GAZE.  NO PTOSIS IS PRESENT. FACIAL FEATURES ARE SYMMETRICAL.  SPEECH IS NORMAL IN FLUENCY, DICTION, AND PHRASING.  TONGUE PROTRUDES IN THE MIDLINE.    GAIT AND STATION:  ROMBERG IS NEGATIVE.  GOOD ALTERNATE ARMSWING WITH NORMAL GAIT.  MOTOR:  NO DOWNDRIFT OF EITHER ARM WHEN HELD AT SHOULDER LEVEL.  MANUAL MUSCLE TESTING OF PROXIMAL AND DISTAL MUSCLES OF BOTH UPPER AND LOWER EXTREMITIES IS NORMAL. MUSCLE MASS IS NORMAL.  MUSCLE TONE IS NORMAL.  SENSORY:  INTACT BOTH UPPER AND LOWER EXTREMITIES TO PIN PRICK, TOUCH, AND VIBRATION.  CEREBELLAR:  FINGER TO NOSE DONE WELL.  ALTERNATING MOVEMENTS INTACT.  NO INVOLUNTARY MOVEMENTS OR TREMOR SEEN.  REFLEXES:  STRETCH REFLEXES ARE 2+  BOTH UPPER AND LOWER EXTREMITIES.  PLANTAR STIMULATION IS FLEXOR BILATERALLY AND NO PATHOLOGICAL REFLEXES ARE SEEN              Assessment:     Encounter Diagnosis   Name Primary?    Intractable migraine without aura and without status migrainosus Yes     Medication overuse headache    I have seen and examined the patient and reviewed pertinent lab and imaging studies.  I have reviewed the patient's medical issues and defer to the primary care provider for the management of the other medical issues.      Plan:    1.  Change propranolol to 40 mg b.i.d. For migraine prevention  2. Reduce Fioricet to no more than 3 tablets a week.  3. May utilize sumatriptan tablet 100 mg at onset of migraine  4.  Keep accurate headache log by utilizing smart phone application and reporting headache frequency through the patient portal so that further adjustments of medication can be made  5.  Routine follow-up with Neurology in 6 months or sooner depending upon results of above measures.      This was a 35 min visit with the patient with over 50% of the time spent counseling the patient regarding medication overuse headache and management of migraine headache.  Medications were discussed in detail.  This note is generated with speech recognition software and is subject to transcription error and sound alike phrases that may be missed by proofreading.

## 2019-03-18 NOTE — PATIENT INSTRUCTIONS
"1.  Reduce the Fioricet to one tablet twice a day for 7days, then one tablet once a day for 7 days, then stop regular use of Fioricet.  May take only one tablet 3 times a week to prevent "rebound" headaches.  2.  Change propranolol 40 mg to one tablet twice a day.  3.  May use Immitrex (sumatriptan) 100 mg at ONSET of migraine, but limit to 3 tablets a week.  4.  Use the smart phone jovani to monitor the migraine for me and report through the patient portal.  "

## 2019-04-01 RX ORDER — PROPRANOLOL HYDROCHLORIDE 40 MG/1
40 TABLET ORAL 2 TIMES DAILY
COMMUNITY
End: 2019-04-01 | Stop reason: SDUPTHER

## 2019-04-01 RX ORDER — PROPRANOLOL HYDROCHLORIDE 40 MG/1
40 TABLET ORAL 2 TIMES DAILY
Qty: 60 TABLET | Refills: 11 | Status: SHIPPED | OUTPATIENT
Start: 2019-04-01 | End: 2019-10-15

## 2019-09-01 ENCOUNTER — HOSPITAL ENCOUNTER (EMERGENCY)
Facility: HOSPITAL | Age: 49
Discharge: HOME | End: 2019-09-01
Attending: EMERGENCY MEDICINE
Payer: MEDICAID

## 2019-09-01 VITALS
RESPIRATION RATE: 15 BRPM | HEART RATE: 72 BPM | SYSTOLIC BLOOD PRESSURE: 116 MMHG | TEMPERATURE: 99 F | BODY MASS INDEX: 28.72 KG/M2 | DIASTOLIC BLOOD PRESSURE: 76 MMHG | OXYGEN SATURATION: 100 % | WEIGHT: 167.31 LBS

## 2019-09-01 DIAGNOSIS — J40 BRONCHITIS: ICD-10-CM

## 2019-09-01 DIAGNOSIS — T78.40XA ALLERGIC RESPONSE, INITIAL ENCOUNTER: ICD-10-CM

## 2019-09-01 DIAGNOSIS — R06.2 WHEEZING: Primary | ICD-10-CM

## 2019-09-01 DIAGNOSIS — J44.9 COPD (CHRONIC OBSTRUCTIVE PULMONARY DISEASE): ICD-10-CM

## 2019-09-01 DIAGNOSIS — Z72.0 TOBACCO ABUSE: ICD-10-CM

## 2019-09-01 PROCEDURE — 25000003 PHARM REV CODE 250: Performed by: EMERGENCY MEDICINE

## 2019-09-01 PROCEDURE — S0028 INJECTION, FAMOTIDINE, 20 MG: HCPCS | Performed by: EMERGENCY MEDICINE

## 2019-09-01 PROCEDURE — 99284 EMERGENCY DEPT VISIT MOD MDM: CPT | Mod: 25

## 2019-09-01 PROCEDURE — 96374 THER/PROPH/DIAG INJ IV PUSH: CPT

## 2019-09-01 PROCEDURE — 25000242 PHARM REV CODE 250 ALT 637 W/ HCPCS: Performed by: EMERGENCY MEDICINE

## 2019-09-01 PROCEDURE — 96375 TX/PRO/DX INJ NEW DRUG ADDON: CPT

## 2019-09-01 PROCEDURE — 94640 AIRWAY INHALATION TREATMENT: CPT

## 2019-09-01 PROCEDURE — 63600175 PHARM REV CODE 636 W HCPCS: Performed by: EMERGENCY MEDICINE

## 2019-09-01 RX ORDER — IPRATROPIUM BROMIDE AND ALBUTEROL SULFATE 2.5; .5 MG/3ML; MG/3ML
3 SOLUTION RESPIRATORY (INHALATION)
Status: COMPLETED | OUTPATIENT
Start: 2019-09-01 | End: 2019-09-01

## 2019-09-01 RX ORDER — METHYLPREDNISOLONE SOD SUCC 125 MG
125 VIAL (EA) INJECTION
Status: COMPLETED | OUTPATIENT
Start: 2019-09-01 | End: 2019-09-01

## 2019-09-01 RX ORDER — PREDNISONE 20 MG/1
40 TABLET ORAL DAILY
Qty: 10 TABLET | Refills: 0 | Status: SHIPPED | OUTPATIENT
Start: 2019-09-01 | End: 2019-09-06

## 2019-09-01 RX ORDER — FAMOTIDINE 10 MG/ML
20 INJECTION INTRAVENOUS
Status: COMPLETED | OUTPATIENT
Start: 2019-09-01 | End: 2019-09-01

## 2019-09-01 RX ORDER — HYDROXYZINE PAMOATE 25 MG/1
25 CAPSULE ORAL
Status: COMPLETED | OUTPATIENT
Start: 2019-09-01 | End: 2019-09-01

## 2019-09-01 RX ADMIN — IPRATROPIUM BROMIDE AND ALBUTEROL SULFATE 3 ML: .5; 3 SOLUTION RESPIRATORY (INHALATION) at 03:09

## 2019-09-01 RX ADMIN — METHYLPREDNISOLONE SODIUM SUCCINATE 125 MG: 125 INJECTION, POWDER, FOR SOLUTION INTRAMUSCULAR; INTRAVENOUS at 03:09

## 2019-09-01 RX ADMIN — FAMOTIDINE 20 MG: 10 INJECTION, SOLUTION INTRAVENOUS at 03:09

## 2019-09-01 RX ADMIN — HYDROXYZINE PAMOATE 25 MG: 25 CAPSULE ORAL at 03:09

## 2019-09-01 NOTE — ED PROVIDER NOTES
"SCRIBE #1 NOTE: I, Ceci Don, am scribing for, and in the presence of, Ana Mondragon MD. I have scribed the entire note.       History     Chief Complaint   Patient presents with    Allergic Reaction     [place] was having an allergic reation to nuts     Review of patient's allergies indicates:   Allergen Reactions    Demerol [meperidine] Hives, Itching and Swelling    Morphine Hives, Itching and Swelling    Pcn [penicillins] Hives    Sulfa (sulfonamide antibiotics)          History of Present Illness     HPI    9/1/2019, 3:26 PM  History obtained from the patient      History of Present Illness: Yamilet De Los Santos is a 49 y.o. female patient with a PMHx of anxiety who presents to the Emergency Department for evaluation of SOB which onset PTA after eating a [employer] bar. Pt states that she has allergies to medications but was not aware of an allergy to nuts. After she ate the bar, she became itchy and her body became numb. She currently c/o SOB and states "I feel like I'm trying to go into anaphylactic shock like when I was given sulfa." Sxs are constant and moderate in severity. Prior tx include Benadryl x2 at unknown time. Pt has a cough. Patient denies fever, chills, sore throat, voice change, stridor, N/V, oropharyngeal/tongue/facial swelling, rash, and all other sxs at this time. Pt smokes and has 2 packs of cigarettes on her.      Arrival mode: Personal vehicle    PCP: Bertram Shah MD        Past Medical History:  Past Medical History:   Diagnosis Date    Anxiety     Asthma     Cancer     Depression     Headache(784.0)     Migraine headache     cluster       Past Surgical History:  Past Surgical History:   Procedure Laterality Date    HEMORRHOID SURGERY      HYSTERECTOMY      MANDIBLE FRACTURE SURGERY           Family History:  Family History   Problem Relation Age of Onset    Diabetes Mother     Heart disease Mother     " Heart disease Father     Diabetes Sister     Cancer Paternal Grandmother     Diabetes Paternal Grandmother     Cancer Paternal Grandfather        Social History:  Social History     Tobacco Use    Smoking status: Current Every Day Smoker     Packs/day: 0.50     Types: Cigarettes    Smokeless tobacco: Never Used   Substance and Sexual Activity    Alcohol use: No    Drug use: No    Sexual activity: Unknown        Review of Systems     Review of Systems   Constitutional: Negative for chills and fever.   HENT: Negative for facial swelling, sore throat, trouble swallowing and voice change.         (-) oropharyngeal or tongue swelling   Respiratory: Positive for cough and shortness of breath. Negative for chest tightness and stridor.    Cardiovascular: Negative for chest pain and palpitations.   Gastrointestinal: Negative for abdominal pain, diarrhea, nausea and vomiting.   Genitourinary: Negative for dysuria.   Musculoskeletal: Negative for back pain.   Skin: Negative for rash.        (+) itchy   Neurological: Positive for numbness. Negative for weakness.   Hematological: Does not bruise/bleed easily.   All other systems reviewed and are negative.     Physical Exam     Initial Vitals   BP Pulse Resp Temp SpO2   09/01/19 1527 09/01/19 1527 09/01/19 1527 09/01/19 1537 09/01/19 1527   115/76 79 13 98.9 °F (37.2 °C) 100 %      MAP       --                 Physical Exam  Nursing Notes and Vital Signs Reviewed.  Constitutional: Patient is in no acute distress. Well-developed and well-nourished.  Head: Atraumatic. Normocephalic.  Eyes: PERRL. EOM intact. Conjunctivae are not pale. No scleral icterus.  ENT: Mucous membranes are moist. Oropharynx is clear and symmetric.  Airway is clear and patent. No tongue/uvula swelling. No facial swelling. No stridor. No voice change. Patient is handling secretions normally, no drooling.  Neck: Supple. Full ROM. No lymphadenopathy.  Cardiovascular: Regular rate. Regular rhythm. No  murmurs, rubs, or gallops. Distal pulses are 2+ and symmetric.  Pulmonary/Chest: No respiratory distress. Faint expiratory wheezing.  No rales.  Abdominal: Soft and non-distended.  There is no tenderness.  No rebound, guarding, or rigidity. Good bowel sounds.  Musculoskeletal: Moves all extremities. No obvious deformities. No edema. No calf tenderness.  Skin: Warm and dry. No rash. No hives.  Neurological:  Alert, awake, and appropriate.  Normal speech.  No acute focal neurological deficits are appreciated.  Psychiatric: Normal affect. Good eye contact. Appropriate in content.     ED Course   Procedures  ED Vital Signs:  Vitals:    09/01/19 1527 09/01/19 1529 09/01/19 1532 09/01/19 1534   BP: 115/76  121/80    Pulse: 79  75 76   Resp: 13  17 18   Temp:       TempSrc:       SpO2: 100%  99% 99%   Weight:  75.9 kg (167 lb 5.3 oz)      09/01/19 1537 09/01/19 1541 09/01/19 1546   BP:   116/76   Pulse: 74 80 72   Resp: 18 (!) 22 15   Temp: 98.9 °F (37.2 °C)     TempSrc: Oral     SpO2: 100% 100% 100%   Weight:        Imaging Results:  Imaging Results          X-Ray Chest AP Portable (Final result)  Result time 09/01/19 15:48:49    Final result by Carmela Gates MD (09/01/19 15:48:49)                 Impression:      No cardiopulmonary process noted.      Electronically signed by: Carmela Gates  Date:    09/01/2019  Time:    15:48             Narrative:    EXAMINATION:  XR CHEST AP PORTABLE    CLINICAL HISTORY:  Chronic obstructive pulmonary disease, unspecified    COMPARISON:  May 2018    FINDINGS:  No infiltrate or effusion identified.  Cardiomediastinal silhouette is within normal limits.                                    The Emergency Provider reviewed the vital signs and test results, which are outlined above.     ED Discussion   3:49 PM: Pt feels better after the breathing tx. She is no longer wheezing. She is not having an anaphylaxis reaction. Discussed with pt all pertinent ED information and  results. Discussed pt dx of  and plan of tx. Gave pt all f/u and return to the ED instructions. All questions and concerns were addressed at this time. Pt expresses understanding of information and instructions, and is comfortable with plan to discharge. Pt is stable for discharge.    I discussed with patient and/or family/caretaker that evaluation in the ED does not suggest any emergent or life threatening medical conditions requiring immediate intervention beyond what was provided in the ED, and I believe patient is safe for discharge.  Regardless, an unremarkable evaluation in the ED does not preclude the development or presence of a serious of life threatening condition. As such, patient was instructed to return immediately for any worsening or change in current symptoms.     Medical Decision Making:   Clinical Tests:   Radiological Study: Ordered and Reviewed           ED Medication(s):  Medications   methylPREDNISolone sodium succinate injection 125 mg (125 mg Intravenous Given 9/1/19 1534)   albuterol-ipratropium 2.5 mg-0.5 mg/3 mL nebulizer solution 3 mL (3 mLs Nebulization Given by Other 9/1/19 1361)   famotidine (PF) injection 20 mg (20 mg Intravenous Given 9/1/19 6904)   hydrOXYzine pamoate capsule 25 mg (25 mg Oral Given 9/1/19 8299)       Discharge Medication List as of 9/1/2019  3:50 PM      START taking these medications    Details   predniSONE (DELTASONE) 20 MG tablet Take 2 tablets (40 mg total) by mouth once daily. for 5 days, Starting Sun 9/1/2019, Until Fri 9/6/2019, Print             Follow-up Information     Emma Lara MD. Schedule an appointment as soon as possible for a visit in 2 days.    Specialty:  Internal Medicine  Why:  Return to the Emergency Room, If symptoms worsen  Contact information:  [facility]  [place] [address]  [phone]                       Scribe Attestation:   Scribe #1: I performed the above scribed service and the documentation accurately describes  the services I performed. I attest to the accuracy of the note.     Attending:   Physician Attestation Statement for Scribe #1: I, Ana Mondragon MD, personally performed the services described in this documentation, as scribed by Ceci Ochoa, in my presence, and it is both accurate and complete.           Clinical Impression       ICD-10-CM ICD-9-CM   1. Wheezing R06.2 786.07   2. COPD (chronic obstructive pulmonary disease) J44.9 496   3. Tobacco abuse Z72.0 305.1   4. Allergic response, initial encounter T78.40XA 995.3   5. Bronchitis J40 490       Disposition:   Disposition: Discharged  Condition: Stable         Ana Mondragon MD  09/02/19 1012

## 2019-09-25 RX ORDER — SUMATRIPTAN SUCCINATE 100 MG/1
TABLET ORAL
Qty: 9 TABLET | Refills: 6 | Status: SHIPPED | OUTPATIENT
Start: 2019-09-25 | End: 2020-08-14

## 2019-11-07 ENCOUNTER — TELEPHONE (OUTPATIENT)
Dept: NEUROLOGY | Facility: CLINIC | Age: 49
End: 2019-11-07

## 2019-11-07 NOTE — TELEPHONE ENCOUNTER
Is there a preventive that does not affect the kidney, she has been having real bad migrianes and needs some help/ her kidneys are getting better and they took her off  A lot of medications but needs to know what can she take as a preventive for her pain.

## 2019-11-07 NOTE — TELEPHONE ENCOUNTER
----- Message from Missy Neal sent at 11/7/2019  2:58 PM CST -----  Contact: pt   Type:  Patient Returning Call    Who Called: pt   Who Left Message for Patient:Dr Bermudez office  Does the patient know what this is regarding?: medication  Would the patient rather a call back or a response via [facility]? Call back  Best Call Back Number: [phone]  Additional Information:       [id number] [address] [address]  Phone: [phone] Fax: [phone]

## 2019-11-07 NOTE — TELEPHONE ENCOUNTER
----- Message from Naz Storey sent at 11/7/2019 11:03 AM CST -----  Contact: pt  Patient needs a call back in regards to migraines that she is having. She can be reached at [phone].        Thanks,  Naz Storey

## 2019-11-07 NOTE — TELEPHONE ENCOUNTER
Advised pt of Md response , she would like to try mothly  injection  For migraine . She stated they are pretty bad since shes not on any preventable medication . Advised pt that MD was out and someone from the office will contact on tomorrow . She verbalized understanding .

## 2019-11-08 ENCOUNTER — TELEPHONE (OUTPATIENT)
Dept: PHARMACY | Facility: CLINIC | Age: 49
End: 2019-11-08

## 2019-11-08 DIAGNOSIS — G43.019 INTRACTABLE MIGRAINE WITHOUT AURA AND WITHOUT STATUS MIGRAINOSUS: Primary | ICD-10-CM

## 2019-11-08 NOTE — TELEPHONE ENCOUNTER
No answer/VM to inform patient that [facility] received prescription for Emgality and prior authorization is required.  [facility] will be back in touch once insurance determination is received.

## 2019-11-22 PROBLEM — J40 BRONCHITIS: Status: ACTIVE | Noted: 2019-11-22

## 2019-11-23 PROBLEM — E87.1 HYPONATREMIA: Status: ACTIVE | Noted: 2019-11-23

## 2019-11-23 PROBLEM — F17.210 CIGARETTE SMOKER: Status: ACTIVE | Noted: 2019-11-23

## 2019-11-23 PROBLEM — J44.1 COPD EXACERBATION: Status: ACTIVE | Noted: 2019-11-23

## 2019-11-23 PROBLEM — E87.6 HYPOKALEMIA: Status: ACTIVE | Noted: 2019-11-23

## 2019-12-16 ENCOUNTER — TELEPHONE (OUTPATIENT)
Dept: NEUROLOGY | Facility: CLINIC | Age: 49
End: 2019-12-16

## 2019-12-16 NOTE — TELEPHONE ENCOUNTER
----- Message from Naz Storey sent at 12/16/2019  2:57 PM CST -----  Contact: pt  Type:  Patient Returning Call    Who Called:pt  Who Left Message for Patient:nurse  Does the patient know what this is regarding?:  Would the patient rather a call back or a response via [facility]? Call back  Best Call Back Number:[phone]  Additional Information:

## 2019-12-16 NOTE — TELEPHONE ENCOUNTER
----- Message from Missy Thomson sent at 12/16/2019 12:59 PM CST -----  Contact: pt   Pt stated she's calling about an injection the doctor was ordering for her, she can be reached at [phone] Thanks

## 2019-12-19 ENCOUNTER — TELEPHONE (OUTPATIENT)
Dept: PHARMACY | Facility: CLINIC | Age: 49
End: 2019-12-19

## 2019-12-19 NOTE — TELEPHONE ENCOUNTER
Initial Emgality consult completed on . Emgality 240mg (loading dose) will be shipped on  to arrive at patient's home on  via [employer]. $0 copay. Patient intends to start Emgality once received. Address confirmed. Confirmed 2 patient identifiers - name and . Therapy Appropriate.    Indication: Migraine prophlaxis  goals of treatment: reduction in migraine frequency, intensity, and/or duration.     Informed patient on online injection video on  website.     Store in refrigerator prior to use (do not freeze, do not shake, keep in original box until use).    Counseled patient on administration directions:  - Dose: Inject two injections (240mg) into the skin as a loading dose, followed by one injection (120mg) every 4 weeks thereafter.   - Take out of the refrigerator 30 minutes prior to injection to reach room temperature.  - Wash hands before and after injection.  - Monthly RX will come with gauze, band aids, and alcohol swabs.  - Patient may self-inject in either the front/top of the thighs, abdomen- but at least 2 inches away from belly button   - If someone else is giving the injection, they may also use the outer part of your upper arm or your buttocks.   - If injecting 2 pens for the loading dose, use 2 different injection sites.   - Patient was instructed to rotate injections sites monthly.  - Inspect medication - should be clear and colorless to slightly yellow to slightly brown.   - Patient is to wipe down the injection site with the alcohol pad, wait to dry.    - Remove white base cap from pen (twist to remove).  - Place the pen flat against the injection site and turn the lock ring to the unlocked position then push down and hold the teal button - there will be an initial click; in 10 seconds you will hear a second click.  - Remove the pen from skin and check to see if the gray plunger is visible - this will indicate that the injection is complete.   - Patient will use sharps  container; once full, per state law, she/he may securely lock the sharps container and place in trash. Pharmacy will replace the sharps at no additional charge.    Patient was counseled on possible side effects:  - Injection site reaction: redness, soreness, itching, bruising, which should resolve within 3-5 days.  - Allergic reactions: itching, rash, hives, swelling of face, mouth, tongue, throat, trouble breathing.   - Informed that there is no data in pregnancy/breastfeeding.     Allergy/Medication Review: Comorbidities, allergies, and medical history on file reviewed. Medication list reviewed (updates: no longer taking Prednisone or Promethazine Liquid); no DDIs with Emgality    Consultation included the importance of compliance and of keeping all follow up appointments.  Patient understands to report any medication changes to [facility] and provider. All questions answered and addressed to patients satisfaction. [facility] will contact patient in 3 weeks for refills.    Asael Martines, PharmD  Clinical Pharmacist   [facility]   P: [phone]

## 2020-01-16 ENCOUNTER — TELEPHONE (OUTPATIENT)
Dept: PHARMACY | Facility: CLINIC | Age: 50
End: 2020-01-16

## 2020-01-16 NOTE — TELEPHONE ENCOUNTER
RX call regarding Emgality from [facility]. Patient reached -- stated that she had never injected the loading doses from December due to being scared of potential khai risks. She was driving and declined a transfer while on the phone but she requested that a pharmacist reach out to her on 1/17 after 2pm. Alerted (emailed) [facility] group.

## 2020-02-13 ENCOUNTER — TELEPHONE (OUTPATIENT)
Dept: PHARMACY | Facility: CLINIC | Age: 50
End: 2020-02-13

## 2020-02-21 PROBLEM — R07.9 CHEST PAIN: Status: ACTIVE | Noted: 2020-02-21

## 2020-02-21 PROBLEM — F41.9 ANXIETY: Status: ACTIVE | Noted: 2020-02-21

## 2020-03-13 ENCOUNTER — TELEPHONE (OUTPATIENT)
Dept: PHARMACY | Facility: CLINIC | Age: 50
End: 2020-03-13

## 2020-04-17 ENCOUNTER — TELEPHONE (OUTPATIENT)
Dept: PHARMACY | Facility: CLINIC | Age: 50
End: 2020-04-17

## 2020-05-14 ENCOUNTER — TELEPHONE (OUTPATIENT)
Dept: PHARMACY | Facility: CLINIC | Age: 50
End: 2020-05-14

## 2020-06-16 ENCOUNTER — TELEPHONE (OUTPATIENT)
Dept: PHARMACY | Facility: CLINIC | Age: 50
End: 2020-06-16

## 2020-06-16 NOTE — TELEPHONE ENCOUNTER
Call to follow up with Emgality drug holiday. Patient states that she no longer wast to take Emgality. She states that she is doing well. She understands that she can call [facility] to set up refill should she want to restart. Will message provider to inform. No other questions or concerns.    Mauro Ochoa, PharmD  Clinical Pharmacist  [facility]  P: [phone]

## 2022-02-08 NOTE — TELEPHONE ENCOUNTER
"Pt called re cough. Spoke with MD (dr brooks) one week ago. Hx asthmatic bronchitis. Also getting vertigo, cant breath and getting worse.     Reason for Disposition   [1] SEVERE asthma attack (e.g., very SOB at rest, speaks in single words, loud wheezes) AND [2] not resolved after 2 nebulizer or inhaler treatments    Answer Assessment - Initial Assessment Questions  1. RESPIRATORY STATUS: "Describe your breathing?" (e.g., wheezing, shortness of breath, unable to speak, severe coughing)      Cough for one week, afeb. On macrodantin  Meclizine. Using albuterol - used neb - cant get enough wind    2. ONSET: "When did this asthma attack begin?"      One week   3. TRIGGER: "What do you think triggered this attack?" (e.g., URI, exposure to pollen or other allergen, tobacco smoke)      bronchitis   4. PEAK EXPIRATORY FLOW RATE (PEFR): "Do you use a peak flow meter?" If so, ask: "What's the current peak flow? What's your personal best peak flow?"      N/a   5. SEVERITY: "How bad is this attack?"     - MILD: No SOB at rest, mild SOB with walking, speaks normally in sentences, can lay down, no retractions, pulse < 100. (GREEN Zone: PEFR %)    - MODERATE: SOB at rest, SOB with minimal exertion and prefers to sit, cannot lie down flat, speaks in phrases, mild retractions, audible wheezing, pulse 100-120. (YELLOW Zone: PEFR 50-80%)     - SEVERE: Very SOB at rest, speaks in single words, struggling to breathe, sitting hunched forward, retractions, usually loud wheezing, sometimes minimal wheezing because of decreased air movement, pulse > 120. (RED Zone: PEFR < 50%).     Cant get enough wind   6. MEDICATIONS (Inhaler or nebs): "What are your asthma medications?" and "What treatments have you given so far?"     - Quick-relief: albuterol, metaproterenol, salbutamol, or other inhaled or nebulized beta-agonist medicines    - Long-term-control: steroids, cromolyn, or other anti-inflammatory medicines.    Nebs. MDI    Protocols " used: ST ASTHMA ATTACK-A-AH  rec ED due to SOB, wheezing. Offered pt ER wait time at [facility]. EMS if symptoms worsen. Call back with questions.      ambulatory

## 2022-05-31 ENCOUNTER — HOSPITAL ENCOUNTER (EMERGENCY)
Facility: HOSPITAL | Age: 52
Discharge: HOME | End: 2022-05-31
Attending: EMERGENCY MEDICINE
Payer: MEDICAID

## 2022-05-31 VITALS
HEART RATE: 85 BPM | SYSTOLIC BLOOD PRESSURE: 127 MMHG | TEMPERATURE: 98 F | RESPIRATION RATE: 16 BRPM | DIASTOLIC BLOOD PRESSURE: 71 MMHG | OXYGEN SATURATION: 98 %

## 2022-05-31 DIAGNOSIS — M25.539 PAIN, WRIST: ICD-10-CM

## 2022-05-31 DIAGNOSIS — S02.85XA: ICD-10-CM

## 2022-05-31 DIAGNOSIS — S61.411A SKIN TEAR OF RIGHT HAND WITHOUT COMPLICATION, INITIAL ENCOUNTER: ICD-10-CM
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
## 2022-05-31 PROCEDURE — 99285 EMERGENCY DEPT VISIT HI MDM: CPT | Mod: 25

## 2022-05-31 PROCEDURE — 63600175 PHARM REV CODE 636 W HCPCS: Performed by: EMERGENCY MEDICINE

## 2022-05-31 PROCEDURE — 90715 TDAP VACCINE 7 YRS/> IM: CPT | Performed by: EMERGENCY MEDICINE

## 2022-05-31 PROCEDURE — 25000003 PHARM REV CODE 250: Performed by: EMERGENCY MEDICINE

## 2022-05-31 PROCEDURE — 90471 IMMUNIZATION ADMIN: CPT | Performed by: EMERGENCY MEDICINE

## 2022-05-31 RX ORDER — HYDROCODONE BITARTRATE AND ACETAMINOPHEN 10; 325 MG/1; MG/1
1 TABLET ORAL
Status: COMPLETED | OUTPATIENT
Start: 2022-05-31 | End: 2022-05-31

## 2022-05-31 RX ORDER — OXYCODONE AND ACETAMINOPHEN 10; 325 MG/1; MG/1
1 TABLET ORAL EVERY 4 HOURS PRN
Qty: 18 TABLET | Refills: 0 | Status: SHIPPED | OUTPATIENT
Start: 2022-05-31

## 2022-05-31 RX ORDER — CLINDAMYCIN HYDROCHLORIDE 150 MG/1
450 CAPSULE ORAL EVERY 8 HOURS
Qty: 45 CAPSULE | Refills: 0 | Status: SHIPPED | OUTPATIENT
Start: 2022-05-31 | End: 2022-06-05

## 2022-05-31 RX ADMIN — TETANUS TOXOID, REDUCED DIPHTHERIA TOXOID AND ACELLULAR PERTUSSIS VACCINE, ADSORBED 0.5 ML: 5; 2.5; 8; 8; 2.5 SUSPENSION INTRAMUSCULAR at 01:05

## 2022-05-31 RX ADMIN — NEOMYCIN AND POLYMYXIN B SULFATES AND BACITRACIN ZINC 1 EACH: 400; 3.5; 5 OINTMENT TOPICAL at 12:05

## 2022-05-31 RX ADMIN — HYDROCODONE BITARTRATE AND ACETAMINOPHEN 1 TABLET: 10; 325 TABLET ORAL at 11:05

## 2022-05-31 NOTE — ED NOTES
"[facility]    Chief Complaint   Patient presents with     Nursing Home Acute       HPI:    Ellie Leigh is a 88 year old  (9/12/1929), who is being seen today for an episodic care visit at [facility].    HPI information obtained from: facility chart records, facility staff and patient report. Today's concern is:  Hyponatremia  Recent labs show 2pt increase in Na++  Staff concerned patient not adhering to 1.5l Fluid restriction.  State family bring patient Gatorade in addition to calculated fluid restriction  Patient states she calculates fluids and includes the Gatorade in it.     Nausea  Patient states nausea is improved since adding Zophran with Tramadol.  States she can feel the nausea coming when meal time is approaching, her stomach is \"empty\"    Intractable low back pain  Scheduled Tramadol is helping pain.  Back/hip pain 5/10, tolerable  Participating in therapy    Primary osteoarthritis of both knees  Staff states patient has been refusing voltaren gel to knees.  Patient states her knees are not always painful so she doesn't want to use the gel if she is not having current pain.      REVIEW OF SYSTEMS:  4 point ROS including Respiratory, CV, GI and , other than that noted in the HPI,  is negative    /66  Pulse 63  Temp 98  F (36.7  C)  Resp 18  Wt 120 lb 3.2 oz (54.5 kg)  LMP 06/15/1985  SpO2 91%  BMI 25.12 kg/m2  GENERAL APPEARANCE:  Alert, in no distress, sitting in chair without pain if does not move  RESP:  respiratory effort and palpation of chest normal, auscultation of lungs clear , no respiratory distress  CV:  Palpation and auscultation of heart done , rate and rhythm regualr, no murmur, no peripheral edema  M/S:   Uses w/c, limited ROM bilateral knees, pain limits movement left hip  SKIN: no concerning lesions  NEURO: 2-12 in normal limits and at patient's baseline, NFD  PSYCH:  insight and judgement, memory WNL , affect and mood " DC indicated per MD. DC instructions explained to pt., who verbalized understanding. NAD, VSS, resp. E/u. AAOx4. Ambulatory out of ED with even, steady gait. Copy of DC instructions provided to registration team member to give to patient with checkout. Pt. At registration desk for checkout. Family member with pt. To drive her home.     normal      ASSESSMENT/PLAN:  Hyponatremia  Education provided re:  Necessity for Na++ restriction, how to count daily fluid allowable    Nausea  Will add snacks to daily plan to avert nausea before meals.  Instructed patient in eating smaller amounts frequently    Intractable low back pain  Scheduled tramadol providing good analgesia    Primary osteoarthritis of both knees  Will change gel to PRN rather than scheduled.  Instructed patient in need to ask for gel if having pain       Orders:  1.  BMP next lab day.  Dx:  SIADH  2.  Include personal Gatorade in fluid restriction calculations  3.  Offer snacks between meals for nausea  4.  Change scheduling of Diclofenac gel to QID PRN--not scheduled      Face to Face and Medical Necessity Statement for DME Provider visit    Demographic Information on Ellie Leigh:  Gender: female  : 1929  [id number] [facility] [facility] [address]  [place] [id number] [phone] (home) NONE (work)    Medical Record: [id number]  Social Security Number: [id number]  Primary Care Provider: Josefina Gómez  Insurance: Payor: [facility] / Plan: [facility] [employer] / Product Type: HMO /     HPI:   Ellie Legih is a 88 year old  (1929), who is being seen today for a face to face provider visit at [facility]; medical necessity statement for DME included. This patient requires the following:  DME Ordered and Medical Necessity Statement   Walker:  4 wheeled seated  The patient has a mobility limitation of ambulation, back pain with sciatica  that significantly impairs her ability to participate in one or more mobility-related activities of daily living in the home. The patient is able to safely use the walker and the functional mobility deficit can be sufficiently resolved with the use of a walker        Pt needing above DME with expected length of need of 99 months    due to medical necessity associated with following diagnosis:     Hyponatremia  Nausea  Intractable low back  pain  Primary osteoarthritis of both knees      PMH   has a past medical history of Breast cancer (H); COPD (chronic obstructive pulmonary disease) (H); Dust allergy; DVT (deep vein thrombosis) in pregnancy (H); HTN (hypertension); Hyponatremia; Hypothyroid; Intermittent atrial fibrillation (H) (12/1/2011); Loose body in joint (4/15/2011); Neck fracture (H); and Syncope (5/12/2013). She also has no past medical history of Chronic kidney disease; Diabetes (H); Obese; or Sleep apnea.  CURRENT MEDICATIONS  Current Outpatient Prescriptions   Medication Sig Dispense Refill     diclofenac (VOLTAREN) 1 % GEL topical gel Place onto the skin 4 times daily as needed for moderate pain       TRAMADOL HCL PO Take 50 mg by mouth 2 times daily       Ondansetron HCl (ZOFRAN PO) Take 4 mg by mouth 2 times daily 30-60 min prior to tramadol       polyethylene glycol (MIRALAX/GLYCOLAX) Packet Take 1.5 packets by mouth daily       bisacodyl (DULCOLAX) 10 MG Suppository Place 10 mg rectally daily as needed for constipation       Ondansetron (ZOFRAN ODT PO) Take 4 mg by mouth every 8 hours as needed for nausea       Acetaminophen (TYLENOL PO) Take 975 mg by mouth 3 times daily       traMADol (ULTRAM) 50 MG tablet Take 0.5-1 tablets (25-50 mg) by mouth every 6 hours as needed for pain (25 mg for pain rating 3-6, 50 mg for pain rating 7-10) 40 tablet 0     ipratropium - albuterol 0.5 mg/2.5 mg/3 mL (DUONEB) 0.5-2.5 (3) MG/3ML neb solution TAKE 1 VIAL BY NEBULIZATION  EVERY SIX HOURS AS NEEDED FOR SHORTNESS OF BREATH/ DYSPNEA OR WHEEZING 180 mL 9     warfarin (COUMADIN) 1 MG tablet As directed by Anticoagulation Clinic (maintenance dose being re-established while at [facility]).  60 tablet 0     ranitidine (ZANTAC) 150 MG tablet Take 1 tablet (150 mg) by mouth 2 times daily 60 tablet 11     albuterol (PROAIR HFA/PROVENTIL HFA/VENTOLIN HFA) 108 (90 BASE) MCG/ACT Inhaler Inhale 2 puffs into the lungs every 6 hours as needed for shortness of breath /  dyspnea 3 Inhaler 1     metoprolol (TOPROL-XL) 25 MG 24 hr tablet TAKE TWO TABLETS BY MOUTH TWICE DAILY  120 tablet 3     levothyroxine (SYNTHROID/LEVOTHROID) 50 MCG tablet TAKE 1 TABLET (50 MCG) BY MOUTH DAILY 90 tablet 2     SYMBICORT 160-4.5 MCG/ACT Inhaler INHALE 2 PUFFS INTO THE LUNGS 2 TIMES DAILY 10.2 g 5     probiotic CAPS Take 1 capsule by mouth every evening        CRANBERRY Take 475 mg by mouth 2 times daily.       ROS:4 point ROS including Respiratory, CV, GI and , other than that noted in the HPI,  is negative     EXAM  Vitals: /66  Pulse 63  Temp 98  F (36.7  C)  Resp 18  Wt 120 lb 3.2 oz (54.5 kg)  LMP 06/15/1985  SpO2 91%  BMI 25.12 kg/m2;BMI= Body mass index is 25.12 kg/(m^2).  See exam above     ASSESSMENT/PLAN:  1. Hyponatremia    2. Nausea    3. Intractable low back pain    4. Primary osteoarthritis of both knees        Orders:  1. Facility staff/TC to contact DME company to get their order form for provider to fill out    ELECTRONICALLY SIGNED BY HECTOR CERTIFIED PROVIDER:  LACI Pena CNP   NPI: [id number]  [facility]  [address], [address]  [place] [address]        Electronically signed by:  LACI Pena CNP

## 2022-05-31 NOTE — Clinical Note
"Yamilet LEVY"Ángela De Los Santos was seen and treated in our emergency department on 5/31/2022.  She may return to work on 06/03/2022.       If you have any questions or concerns, please don't hesitate to call.      RENAN, RN RN    "

## 2022-05-31 NOTE — ED NOTES
All of pt's arm wounds cleaned and neomycin ointment applied to them. Steristrips placed on many of the wounds (skin tears). Non-stick pads placed over the wounds and forearms wrapped in gauze.
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
## 2022-05-31 NOTE — ED NOTES
Bed: 06  Expected date:   Expected time:   Means of arrival: Ambulance Service  Comments:  When open

## 2024-02-07 ENCOUNTER — TELEPHONE (OUTPATIENT)
Dept: ORTHOPEDICS | Facility: CLINIC | Age: 54
End: 2024-02-07
Payer: MEDICAID

## 2024-02-07 NOTE — TELEPHONE ENCOUNTER
----- Message from Fatemeh Adams MA sent at 2/7/2024  1:46 PM CST -----  Contact: mariah Winters Referral    Medicaid   Call back to schedule  [phone]

## 2024-03-12 ENCOUNTER — TELEPHONE (OUTPATIENT)
Dept: ORTHOPEDICS | Facility: CLINIC | Age: 54
End: 2024-03-12
Payer: MEDICAID

## 2024-03-12 NOTE — TELEPHONE ENCOUNTER
Spoke with pt. Informed pt that there no Medicaid slots open at the moment. Gave pt the number to [facility]

## 2024-03-12 NOTE — TELEPHONE ENCOUNTER
----- Message from Danita Boss sent at 3/12/2024 10:15 AM CDT -----  Type:  Needs Medical Advice    Who Called:  Pt   Would the patient rather a call back or a response via [facility]? Callback   Best Call Back Number:  [phone]  Additional Information:  Pt is requesting a callback from this provider office in regards to upcoming appt questions and concerns     .
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
## 2024-03-14 ENCOUNTER — TELEPHONE (OUTPATIENT)
Dept: ORTHOPEDICS | Facility: CLINIC | Age: 54
End: 2024-03-14
Payer: MEDICAID

## 2024-03-14 NOTE — TELEPHONE ENCOUNTER
----- Message from Rafi Concepcion sent at 3/14/2024 11:10 AM CDT -----  .Type:  Needs Medical Advice    Who Called: pt    Would the patient rather a call back or a response via [facility]? Call back  Best Call Back Number: [phone]  Additional Information:     Pt would like a call back to ask if the doctor does the surgery 2 labral tears on left hip